# Patient Record
Sex: MALE | Race: WHITE | Employment: FULL TIME | URBAN - METROPOLITAN AREA
[De-identification: names, ages, dates, MRNs, and addresses within clinical notes are randomized per-mention and may not be internally consistent; named-entity substitution may affect disease eponyms.]

---

## 2018-10-30 ENCOUNTER — HOSPITAL ENCOUNTER (OUTPATIENT)
Dept: CT IMAGING | Age: 53
Discharge: HOME | End: 2018-10-30
Payer: SELF-PAY

## 2018-10-30 DIAGNOSIS — Z00.00 PREVENTATIVE HEALTH CARE: ICD-10-CM

## 2018-10-30 PROCEDURE — 75571 CT HRT W/O DYE W/CA TEST: CPT

## 2018-10-31 NOTE — CARDIO/PULMONARY
Patient returned my call. I shared his coronary artery CT score of 712 with him. We discussed the meaning of this score and our recommendation that he follow up with a cardiologist within a week. Patient plans to follow up with Dr. Camille Sexton who is his wife's doctor. Patient questions discussed. Patient has no further questions at this time.

## 2018-11-01 ENCOUNTER — TELEPHONE (OUTPATIENT)
Dept: CARDIOLOGY CLINIC | Age: 53
End: 2018-11-01

## 2018-11-01 NOTE — TELEPHONE ENCOUNTER
Pt wife following on heart scan results to have  seen sooner than first available with Dr. Frandy Robles only.   Phone # [phone] or #[phone]  Thanks

## 2018-11-12 ENCOUNTER — OFFICE VISIT (OUTPATIENT)
Dept: CARDIOLOGY CLINIC | Age: 53
End: 2018-11-12

## 2018-11-12 VITALS
SYSTOLIC BLOOD PRESSURE: 128 MMHG | BODY MASS INDEX: 27.68 KG/M2 | WEIGHT: 222.6 LBS | DIASTOLIC BLOOD PRESSURE: 86 MMHG | RESPIRATION RATE: 12 BRPM | OXYGEN SATURATION: 97 % | HEIGHT: 75 IN | HEART RATE: 64 BPM

## 2018-11-12 DIAGNOSIS — N18.9 CHRONIC KIDNEY DISEASE, UNSPECIFIED CKD STAGE: ICD-10-CM

## 2018-11-12 DIAGNOSIS — N02.8 IGA NEPHROPATHY: ICD-10-CM

## 2018-11-12 DIAGNOSIS — E78.5 HYPERLIPIDEMIA, UNSPECIFIED HYPERLIPIDEMIA TYPE: ICD-10-CM

## 2018-11-12 DIAGNOSIS — R93.1 AGATSTON CAC SCORE, >400: Primary | ICD-10-CM

## 2018-11-12 RX ORDER — GLUCOSAM/CHONDRO/HERB 149/HYAL 750-100 MG
1 TABLET ORAL DAILY
COMMUNITY

## 2018-11-12 RX ORDER — RAMIPRIL 5 MG/1
CAPSULE ORAL
Refills: 4 | COMMUNITY
Start: 2018-10-21

## 2018-11-12 NOTE — PROGRESS NOTES
Ray Dasilva, [facility] [address]  [address]  [place], [address] [facility]    Office [phone]  Fax [phone]  Cell [phone]    Ying Brown is a 48 y.o. male self-referred for evaluation of high CAC. Assessment:   Encounter Diagnoses   Name Primary?  Agatston CAC score, >400 Yes    IgA nephropathy     Chronic kidney disease, unspecified CKD stage     Hyperlipidemia, unspecified hyperlipidemia type          Recommendations:  CAD by CT heart scan (712). He has no sxs of angina at a good functional capacity. Will evaluate for ischemia with stress echo in the near future. Start Aspirin 81 mg/d. Will obtain advanced lipid testing with CHL labs, then tailor therapy accordingly. Long discussion about plant based nutrition, regular exercise. I explained the pathobiology of CAD and the potential downstream manifestations. He and his wife are both motivated to make changes. He was on a statin several years ago, but does not recall any side effects. He was told he was prediabetic at one point, but then lost 10-15 lbs. Regroup to review lab data and stress test.     Follow-up Disposition: Not on File     Subjective:  No previous cardiac history other than mild HTN. He recently underwent CT heart scan, demonstrating calcium score of 712. He has stable CKD from IgA nephropathy. Today, Mr. Jorje Palmer states he is doing well. 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 He exercises 2x/week, with no exertional symptoms. He states he and his wife are trying to improve their diet. He enjoys unsweetened tea. He reports a one year history of numbness/paresthesias in his feet, unchanged by 10-15 lb weight loss. He is interested in seeing a nutritionist.     Patient denies any exertional chest pain, dyspnea, palpitations, syncope, orthopnea, edema or paroxysmal nocturnal dyspnea.     Cardiac risk factors:  HTN yes  DM no  Smoking no    Cardiac testin18 - SR, normal EKG  CT heart scan 10/30/18 - CAC = 712 (LAD, LCX)    Past Medical History:   Diagnosis Date    Chronic kidney disease     Essential hypertension     Hyperlipidemia     IgA nephropathy       No Known Allergies     Review of Systems:  Constitutional: Negative for fever, chills, malaise/fatigue and diaphoresis. Respiratory: Negative for cough, hemoptysis, sputum production, shortness of breath and wheezing. Cardiovascular: Negative for chest pain, palpitations, orthopnea, claudication, leg swelling and PND. Gastrointestinal: Negative for heartburn, nausea, vomiting, blood in stool and melena. Genitourinary: Negative for dysuria and flank pain. Musculoskeletal: Negative for joint pain and back pain. Skin: Negative for rash. Neurological: Negative for focal weakness, seizures, loss of consciousness, weakness and headaches. +paresthesias in LE  Endo/Heme/Allergies: Does not bruise/bleed easily. Psychiatric/Behavioral: Negative for memory loss. The patient does not have insomnia. Physical Exam:  Visit Vitals  /86 (BP 1 Location: Left arm, BP Patient Position: Sitting)   Pulse 64   Resp 12   Ht 6' 3\" (1.905 m)   Wt 222 lb 9.6 oz (101 kg)   SpO2 97%   BMI 27.82 kg/m²     Wt Readings from Last 3 Encounters:   11/12/18 222 lb 9.6 oz (101 kg)      General - well developed well nourished  Neck - JVP normal, thyroid nl  Cardiac - normal S1, S2, no murmurs, rubs or gallops. No clicks  Vascular - carotids without bruits, radials, femorals and pedal pulses equal bilateral  Lungs - clear to auscultation bilaterals, no rales, wheezing or rhonchi  Abd - soft nontender, no HSM, no abd bruits  Extremities - no edema  Skin - no rash  Neuro - nonfocal  Psych - normal mood and affect    Cardiographics:  11/12/18 - SR, normal EKG  CT heart scan 10/30/18 - CAC = 712 (LAD, LCX)    Written by Theresa Ramirez, as dictated by Dr. Jose Smith.

## 2018-11-12 NOTE — PATIENT INSTRUCTIONS
Start Aspirin 81 mg daily. Consider seeing Neurologist Dr. Esperanza Rose    The Ananindsohaa Diet    This heart healthy diet is directed to those who do not want to develop heart  disease and those who want to reverse heart disease. Foods to Avoid  The following foods cannot be eaten if a heart healthy diet is to be effective. 1. Anything with a face or a mother. This includes meat, poultry, fish and eggs. 2. Dairy products that include butter, cheese, cream, ice cream, yogurt, milk and  skimmed milk. 3. Oils: All oils including olive oil and canola oil. 4. Refined grains: White rice, \"enriched\" flour products that are found in pastas,  breads, bagels and baked goods. 5. Nuts: Individuals with heart disease should avoid all nuts. Those without the  disease can consume some walnuts which provide considerable omega-3 fatty  acids. Foods That Are Allowed  The following are foods needed to promote heart health by a cholesterol lowering  diet. 1. Vegetables - Just about all vegetables are permitted on this plan with the  exception of avocados. If you are a cardiac patient avocados have a very high  fat content. Those that do not have heart disease may eat avocados as long as  blood lipids are not elevated. 2. Legumes - Beans, peas, and lentils of all kinds. 3. Whole Grains - Just about any grain as long as it is \"whole\" grain. \"Whole\"  means that it has not been polished or processed to eliminate much of the  nutritional value. You should eat breakfast cereals that do not contain added oil  and sugar. Breads should be whole grain without added oil. Whole grain pastas  are allowed. You should be careful of restaurant pastas that almost always  contain eggs, white flour and some oil. 4. Fruit - Fruit of all kinds are permitted. It's best to limit fruit consumption to  three servings a day. Also, avoid consuming pure fruit juices since you get  excessive amounts of sugar that will elevate your triglycerides.  Be careful of all  desserts for the same reason. 5. Beverages - The heart healthy diet allows the following beverages. Water,  seltzer water, oat milk, no-fat soy milk, coffee, and tea. Alcohol is okay in  moderation. Supplements  For those who have heart disease, a heart healthy diet includes supplements and  Dr. Paul Solitario recommends the followin. Multivitamin - One a day that covers all the basic requirements. 2. Vitamin B12 - 1000 mcg (micrograms) daily. 3. Calcium - People over 50 should take 1000 milligrams daily. ..over 60 take  1200 mg daily. 4. Vitamin D - Those over 50 should take 1000 IU daily. 5. Omega Fatty Acids - Consume one tablespoon of flaxseed meal each day. Perhaps sprinkling on your cereal. Keep flaxseed meal refrigerated. 6. Cholesterol - lowering drugs (if necessary). These must be taken under supervision of a physician. Drugs may be needed if you  cannot get your total cholesterol below 150mg/dL. Monitor your progress for the first two months with the help of your doctor. Have  three to four tests. The first and third test should be a full cholesterol profile. The  As your heart healthy diet progresses and your numbers get better, your doctor  may reduce your medication and possibly eliminate it completely. Your total  cholesterol target is below 150mg/dL. Laboratory Test - Maximum Readings for Long Term Health  The following is the goal set by Dr. Paul Solitario and the end result of following his  program for a heart healthy diet. 1. Total blood cholesterol should be 150mg/dL or less. 2. LDL levels 80mg/dL or lower. This should be achieved by using strict plant based nutrition, and when necessary,  doses of cholesterol lowering drugs. \"EAT FOOD, NOT TOO MUCH, MOSTLY PLANTS\"  Bishnu Morrow    We highly recommend this book. .. Prevent and Reverse Heart Disease - The revolutionary, scientifically proven,  nutrition based cure for heart disease.

## 2018-11-12 NOTE — PROGRESS NOTES
Room 4  No cardiac complaints at this time. He does have numbness both feet times 1 year.     Visit Vitals  /86 (BP 1 Location: Left arm, BP Patient Position: Sitting)   Pulse 64   Resp 12   Ht 6' 3\" (1.905 m)   Wt 222 lb 9.6 oz (101 kg)   SpO2 97%   BMI 27.82 kg/m²

## 2018-11-16 PROBLEM — R93.1 AGATSTON CAC SCORE, >400: Status: ACTIVE | Noted: 2018-11-16

## 2018-11-16 RX ORDER — GUAIFENESIN 100 MG/5ML
81 LIQUID (ML) ORAL DAILY
Qty: 90 TAB | Refills: 3
Start: 2018-11-16

## 2018-12-05 ENCOUNTER — DOCUMENTATION ONLY (OUTPATIENT)
Dept: CARDIOLOGY CLINIC | Age: 53
End: 2018-12-05

## 2018-12-05 NOTE — PROGRESS NOTES
Labs Drawn 11/19/18     In Range Out of Range   Lp-PLA2  83   CRP  2.8   OxLDL     Fibrinogen Mass     Apolipoprotein A1 135    Apolipoprotein B  132   ApoB/ApoA1 Ratio  0.98   sdLDL  41.5   Lp(a)  159   HDL2b 31    Total Cholesterol  215   LDL-Calculated  146   Direct HDL Chol. 39   Triglycerides  150   Non-HDL Chol.   176   Insulin 5.6    HbA1C 5.6    Glucose 114    OxLDL     TMAO 3.4    Adiponectin     Homocysteine     NT-proBNP 88    Vitamin D  28.9   Glucose 99    Calcium 9.7    Sodium 142    Potassium 4.4    Chloride 105    CO2 23    BUN 15    Creatinine 1.26    Albumin 4.6    Total Protein 7.0    Globulin 2.4    ALP 73    ALT 16    AST 22    Total bilirubin 0.4    EGFR, NonAA 65    EGFR, AA 73    Estradiol     Estrone, Serum     Total testosterone     TSH     T3, Free     APO E Genotype 3/3

## 2018-12-05 NOTE — PROGRESS NOTES
Labs drawn High Risk Intermediate Risk Optimal  
Total Cholesterol LDL     
HDL     
TG     
Non-HDL Apo B     
LDL-P Small LDL-P     
sdLDL-C Apo A-I     
HDL-P     
HDL2-C Apo B: Apo A-I ratio     
Lp(a)-P Hs-CRP Lp-PLA2 Myeloperoxidase NT-proBNP Apolipoprotein E     
[id number]* [id number]*14* Factor V Leiden Prothrombin Mutation MTHFR     
25-hydroxy-Vitamin D Homocysteine Creatinine, serum Campesterol Sitosterol Cholestanol Desmosterol Glucose Free Fatty Acid Insulin Omega 3

## 2018-12-06 ENCOUNTER — CLINICAL SUPPORT (OUTPATIENT)
Dept: CARDIOLOGY CLINIC | Age: 53
End: 2018-12-06

## 2018-12-06 ENCOUNTER — DOCUMENTATION ONLY (OUTPATIENT)
Dept: CARDIOLOGY CLINIC | Age: 53
End: 2018-12-06

## 2018-12-06 DIAGNOSIS — E78.5 HYPERLIPIDEMIA, UNSPECIFIED HYPERLIPIDEMIA TYPE: ICD-10-CM

## 2018-12-06 DIAGNOSIS — R93.1 AGATSTON CAC SCORE, >400: ICD-10-CM

## 2018-12-06 NOTE — PROGRESS NOTES
Labs Drawn 11/19/18     In Range Out of Range   Lp-PLA2  83   CRP  2.8   OxLDL     Fibrinogen Mass     Apolipoprotein A1 135    Apolipoprotein B  132   ApoB/ApoA1 Ratio  0.98   sdLDL  41.5   Lp(a)  159   HDL2b 31    Total Cholesterol  215   LDL-Calculated  146   Direct HDL Chol. 39   Triglycerides  150   Non-HDL Chol.   176   Insulin  5.6   HbA1C  5.6   Glucose     OxLDL     TMAO 3.4    Adiponectin     Homocysteine     NT-proBNP 88    Vitamin D  28.9   Glucose 99    Calcium 9.7    Sodium 142    Potassium 4.4    Chloride 105    CO2 23    BUN 15    Creatinine 1.26    Albumin 4.6    Total Protein 7.0    Globulin 2.4    ALP 73    ALT 16    AST 22    Total bilirubin 0.4    EGFR, NonAA 65    EGFR, AA 76    Estradiol     Estrone, Serum     Total testosterone     TSH     T3, Free     APO E Genotype 3/3

## 2018-12-10 ENCOUNTER — TELEPHONE (OUTPATIENT)
Dept: CARDIOLOGY CLINIC | Age: 53
End: 2018-12-10

## 2018-12-10 NOTE — TELEPHONE ENCOUNTER
----- Message from Anna Doshi MD sent at 12/10/2018 11:09 AM EST -----  Normal stress echo. Reviewed recent labs - needs office visit to discuss in more detail - will need treatment. Can you let him know?

## 2018-12-18 ENCOUNTER — TELEPHONE (OUTPATIENT)
Dept: CARDIOLOGY CLINIC | Age: 53
End: 2018-12-18

## 2018-12-18 NOTE — TELEPHONE ENCOUNTER
[facility] is calling in regards to wanting to get the diagnosis code for the patient. Phone# [phone]  Thanks.

## 2018-12-27 ENCOUNTER — OFFICE VISIT (OUTPATIENT)
Dept: CARDIOLOGY CLINIC | Age: 53
End: 2018-12-27

## 2018-12-27 VITALS
WEIGHT: 224.4 LBS | DIASTOLIC BLOOD PRESSURE: 70 MMHG | BODY MASS INDEX: 27.9 KG/M2 | OXYGEN SATURATION: 97 % | HEART RATE: 67 BPM | SYSTOLIC BLOOD PRESSURE: 110 MMHG | HEIGHT: 75 IN

## 2018-12-27 DIAGNOSIS — N18.9 CHRONIC KIDNEY DISEASE, UNSPECIFIED CKD STAGE: ICD-10-CM

## 2018-12-27 DIAGNOSIS — N02.8 IGA NEPHROPATHY: ICD-10-CM

## 2018-12-27 DIAGNOSIS — R93.1 AGATSTON CAC SCORE, >400: Primary | ICD-10-CM

## 2018-12-27 DIAGNOSIS — E78.41 ELEVATED LIPOPROTEIN(A): ICD-10-CM

## 2018-12-27 RX ORDER — ROSUVASTATIN CALCIUM 10 MG/1
10 TABLET, COATED ORAL DAILY
Qty: 30 TAB | Refills: 3 | Status: SHIPPED | OUTPATIENT
Start: 2018-12-27 | End: 2018-12-27 | Stop reason: SDUPTHER

## 2018-12-27 RX ORDER — ROSUVASTATIN CALCIUM 10 MG/1
10 TABLET, COATED ORAL DAILY
Qty: 30 TAB | Refills: 3 | Status: SHIPPED | OUTPATIENT
Start: 2018-12-27 | End: 2019-04-16 | Stop reason: SDUPTHER

## 2018-12-27 NOTE — PROGRESS NOTES
Ray Harrison Comment, [address]  [address]  [place], [address] [facility]    Office [phone]  Fax [phone]  Cell [phone]    Marcio Cooley is a 48 y.o. male self-referred for evaluation of high CAC. Assessment:   Encounter Diagnoses   Name Primary?  IgA nephropathy     Chronic kidney disease, unspecified CKD stage     Agatston CAC score, >400 Yes    Elevated lipoprotein(a)          Recommendations:  CAD by CT heart scan (712) without evidence of myocardial ischemia by recent stress echo. He has no sxs of angina at a good functional capacity. Continue aspirin 81 mg/d. Start statin Rx - see below Long discussion about plant based nutrition, regular exercise. Dyslipidemia with elevated Lp(a), elevated inflammatory markers (Lp-PLA2, CRP), excess small markers but no evidence of IR. Reviewed impact of markers in detail. Long discussion about clean eating, plant based nutrition - they are onboard. Will start rosuvastatin 10 mg/d+ CoQ10 100 mg bid. Repeat CHL labs in 3 months    Low Vit D3 - supplement with 3000 international units daily. Follow-up Disposition:  Return in about 3 months (around 3/27/2019). Subjective:  Feels great. Recent stress echo nl. Patient denies any exertional chest pain, dyspnea, palpitations, syncope, orthopnea, edema or paroxysmal nocturnal dyspnea. Here with his wife. Cardiac risk factors:  HTN yes  DM no  Smoking no    Cardiac testin18 - SR, normal EKG  CT heart scan 10/30/18 - CAC = 712 (LAD, LCX)   Stress echo 2018 - 11 min, normal study    Past Medical History:   Diagnosis Date    Chronic kidney disease     Essential hypertension     Hyperlipidemia     IgA nephropathy       No Known Allergies     . 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    Respiratory: Negative for cough, hemoptysis, sputum production, shortness of breath and wheezing. Cardiovascular: Negative for chest pain, palpitations, orthopnea, claudication, leg swelling and PND. Gastrointestinal: Negative for heartburn, nausea, vomiting, blood in stool and melena. Genitourinary: Negative for dysuria and flank pain. Musculoskeletal: Negative for joint pain and back pain. Skin: Negative for rash. Neurological: Negative for focal weakness, seizures, loss of consciousness, weakness and headaches. +paresthesias in LE  Endo/Heme/Allergies: Does not bruise/bleed easily. Psychiatric/Behavioral: Negative for memory loss. The patient does not have insomnia. Physical Exam:  Visit Vitals  /70 (BP 1 Location: Left arm, BP Patient Position: Sitting)   Pulse 67   Ht 6' 3\" (1.905 m)   Wt 224 lb 6.4 oz (101.8 kg)   SpO2 97%   BMI 28.05 kg/m²     Wt Readings from Last 3 Encounters:   12/27/18 224 lb 6.4 oz (101.8 kg)   11/12/18 222 lb 9.6 oz (101 kg)      General - well developed well nourished  Neck - JVP normal, thyroid nl  Cardiac - normal S1, S2, no murmurs, rubs or gallops.  No clicks  Vascular - carotids without bruits, radials, femorals and pedal pulses equal bilateral  Lungs - clear to auscultation bilaterals, no rales, wheezing or rhonchi  Abd - soft nontender, no HSM, no abd bruits  Extremities - no edema  Skin - no rash  Neuro - nonfocal  Psych - normal mood and affect    Cardiographics:  11/12/18 - SR, normal EKG  CT heart scan 10/30/18 - CAC = 712 (LAD, LCX)      Rafia Duff MD

## 2018-12-27 NOTE — PATIENT INSTRUCTIONS
Start CoQ10 100 mg twice daily  Start Vit D3 3000 IUs daily  Start Crestor 10 mg daily      Repeat labs in 3 months

## 2018-12-27 NOTE — PROGRESS NOTES
Patient says he has numbness in feet    Visit Vitals  /70 (BP 1 Location: Left arm, BP Patient Position: Sitting)   Pulse 67   Ht 6' 3\" (1.905 m)   Wt 224 lb 6.4 oz (101.8 kg)   SpO2 97%   BMI 28.05 kg/m²

## 2019-03-25 NOTE — PROGRESS NOTES
Ray Kincaid, [facility] [address]  [address] [facility], [address] [facility] [place]    Office [phone]  Fax [phone]  Cell [phone]    Pete Carrillo is a 48 y.o. male. Last seen 6 months ago. Assessment:   Encounter Diagnoses   Name Primary?  Agatston CAC score, >400     Elevated lipoprotein(a)     IgA nephropathy     Dyslipidemia Yes     Recommendations:  CAD by CT heart scan (712) without evidence of myocardial ischemia by recent stress echo. Stress echo 2018 was normal. He has no sxs of angina at a very good functional capacity. Continue aspirin 81 mg/d plus statin therapy. His diet seems to be fairly clean, low carb. Dyslipidemia with elevated Lp(a), elevated inflammatory markers (Lp-PLA2, CRP). Good response with the addition of Crestor 10 mg daily. LP-PLA2 has normalized, but CRP remains elevated (Possible joint inflammation from weight lifting?) LDL-c has decreased from 146 to 94 and Apo-B from 132 to 92. FBS is in the  range despite his slim figure and low carb diet. Continue to monitor. Repeat CHL labs in 6 months. Continue Vitamin D3. Check vitamin D levels in 6 months. Follow-up and Dispositions    · Return in about 6 months (around 2019). Subjective:  Mr. Keyonna Villalpando states he is doing well. He is tolerating Crestor with no side effects. He has been following a clean diet, and has decreased his taking of carbohydrates. He works out at [employer] 4-5x/week, with no exertional symptoms. Patient denies any exertional chest pain, dyspnea, palpitations, syncope, orthopnea, edema or paroxysmal nocturnal dyspnea. He further denies any fevers or inflammation.      Cardiac risk factors:  HTN yes  DM no  Smoking no    Cardiac testin18 - SR, normal EKG  CT heart scan 10/30/18 - CAC = 712 (LAD, LCX)   Stress echo 2018 - 11 min, normal study    Past Medical History:   Diagnosis Date    Chronic kidney disease  Essential hypertension     Hyperlipidemia     IgA nephropathy       No Known Allergies     . works in [employer] for [employer]. Review of Systems:  Constitutional: Negative for fever, chills, malaise/fatigue and diaphoresis. Respiratory: Negative for cough, hemoptysis, sputum production, shortness of breath and wheezing. Cardiovascular: Negative for chest pain, palpitations, orthopnea, claudication, leg swelling and PND. Gastrointestinal: Negative for heartburn, nausea, vomiting, blood in stool and melena. Genitourinary: Negative for dysuria and flank pain. Musculoskeletal: Negative for joint pain and back pain. Skin: Negative for rash. Neurological: Negative for focal weakness, seizures, loss of consciousness, weakness and headaches. Endo/Heme/Allergies: Does not bruise/bleed easily. Psychiatric/Behavioral: Negative for memory loss. The patient does not have insomnia. Physical Exam:  Visit Vitals  /68   Pulse 74   Resp 20   Ht 6' 3\" (1.905 m)   Wt 220 lb (99.8 kg)   SpO2 96%   BMI 27.50 kg/m²     Wt Readings from Last 3 Encounters:   03/27/19 220 lb (99.8 kg)   12/27/18 224 lb 6.4 oz (101.8 kg)   11/12/18 222 lb 9.6 oz (101 kg)      General - well developed well nourished  Neck - JVP normal, thyroid nl  Cardiac - normal S1, S2, no murmurs, rubs or gallops. No clicks  Vascular - carotids without bruits, radials, femorals and pedal pulses equal bilateral  Lungs - clear to auscultation bilaterals, no rales, wheezing or rhonchi  Abd - soft nontender, no HSM, no abd bruits  Extremities - no edema  Skin - no rash  Neuro - nonfocal  Psych - normal mood and affect    Cardiographics:  11/12/18 - SR, normal EKG  CT heart scan 10/30/18 - CAC = 712 (LAD, LCX)    Written by Pantera Miller, as dictated by Dr. Raoul Alfredo.      Raoul Alfredo MD

## 2019-03-27 ENCOUNTER — DOCUMENTATION ONLY (OUTPATIENT)
Dept: CARDIOLOGY CLINIC | Age: 54
End: 2019-03-27

## 2019-03-27 ENCOUNTER — OFFICE VISIT (OUTPATIENT)
Dept: CARDIOLOGY CLINIC | Age: 54
End: 2019-03-27

## 2019-03-27 VITALS
DIASTOLIC BLOOD PRESSURE: 68 MMHG | HEART RATE: 74 BPM | RESPIRATION RATE: 20 BRPM | WEIGHT: 220 LBS | HEIGHT: 75 IN | BODY MASS INDEX: 27.35 KG/M2 | OXYGEN SATURATION: 96 % | SYSTOLIC BLOOD PRESSURE: 126 MMHG

## 2019-03-27 DIAGNOSIS — E78.5 DYSLIPIDEMIA: Primary | ICD-10-CM

## 2019-03-27 DIAGNOSIS — R93.1 AGATSTON CAC SCORE, >400: ICD-10-CM

## 2019-03-27 DIAGNOSIS — E78.41 ELEVATED LIPOPROTEIN(A): ICD-10-CM

## 2019-03-27 DIAGNOSIS — N02.8 IGA NEPHROPATHY: ICD-10-CM

## 2019-03-27 RX ORDER — ERGOCALCIFEROL 1.25 MG/1
50000 CAPSULE ORAL
COMMUNITY
End: 2019-03-27

## 2019-03-27 NOTE — PROGRESS NOTES
Visit Vitals  /68   Pulse 74   Resp 20   Ht 6' 3\" (1.905 m)   Wt 220 lb (99.8 kg)   SpO2 96%   BMI 27.50 kg/m²     CHL today

## 2019-03-27 NOTE — PROGRESS NOTES
Labs Drawn 3/19/19     In Range Out of Range   Lp-PLA2 66    CRP  3.7   OxLDL     LDL Particle Num  [id number]   Fibrinogen Mass     Apolipoprotein A1     Apolipoprotein B  92   ApoB/ApoA1 Ratio     sdLDL     Lp(a)     HDL2b     Total Cholesterol 152    LDL-Calculated 94    Direct HDL Chol.      HDL-C  39   Triglycerides 94    HDL-Particle Num  29.2   Small LDL Particle  Num  [id number]   Large VLDL-P 1.1    LDL Size 20.6    Large HDL-P  2.5   VLDL Size 42.1    HDL Size  8.5   LP-IR Score  52   Insulin 6.3    HbA1C     Glucose  100   OxLDL     TMAO     Adiponectin     Homocysteine     NT-proBNP     Vitamin D     Glucose  100   Calcium 9.5    Sodium 141    Potassium 4.3    Chloride 103    CO2 26    BUN 12    Creatinine  1.35   Albumin 4.6    Total Protein 6.8    Globulin 2.2    ALP 74    ALT 26    AST 25    Total bilirubin 0.4    EGFR, NonAA  60   EGFR, AA 70    Estradiol     Estrone, Serum     Total testosterone     TSH     T3, Free     APO E Genotype

## 2019-04-01 PROBLEM — E78.5 DYSLIPIDEMIA: Status: ACTIVE | Noted: 2019-04-01

## 2019-09-25 ENCOUNTER — OFFICE VISIT (OUTPATIENT)
Dept: CARDIOLOGY CLINIC | Age: 54
End: 2019-09-25

## 2019-09-25 VITALS
DIASTOLIC BLOOD PRESSURE: 80 MMHG | BODY MASS INDEX: 26.88 KG/M2 | SYSTOLIC BLOOD PRESSURE: 110 MMHG | HEIGHT: 75 IN | OXYGEN SATURATION: 98 % | WEIGHT: 216.2 LBS | HEART RATE: 73 BPM

## 2019-09-25 DIAGNOSIS — N18.9 CHRONIC KIDNEY DISEASE, UNSPECIFIED CKD STAGE: ICD-10-CM

## 2019-09-25 DIAGNOSIS — E78.5 DYSLIPIDEMIA: ICD-10-CM

## 2019-09-25 DIAGNOSIS — E78.41 ELEVATED LIPOPROTEIN(A): ICD-10-CM

## 2019-09-25 DIAGNOSIS — R93.1 AGATSTON CAC SCORE, >400: Primary | ICD-10-CM

## 2019-09-25 DIAGNOSIS — N02.8 IGA NEPHROPATHY: ICD-10-CM

## 2019-09-25 RX ORDER — CHOLECALCIFEROL TAB 125 MCG (5000 UNIT) 125 MCG
TAB ORAL DAILY
COMMUNITY

## 2019-09-25 NOTE — PROGRESS NOTES
Patient says he has no cardiac complaints today       Visit Vitals  /80 (BP 1 Location: Right arm, BP Patient Position: Sitting)   Pulse 73   Ht 6' 3\" (1.905 m)   Wt 216 lb 3.2 oz (98.1 kg)   SpO2 98%   BMI 27.02 kg/m²

## 2019-09-25 NOTE — PROGRESS NOTES
[facility], [facility] [address]  [address] [place], [facility], [address] [facility]    Office [phone]  Fax [phone]  Cell [phone]      Pamela Marin is a 48 y.o. male. Last seen 6 months ago. Assessment:   Encounter Diagnoses   Name Primary?  Agatston CAC score, >400 Yes    Dyslipidemia     Elevated lipoprotein(a)     IgA nephropathy     Chronic kidney disease, unspecified CKD stage      Recommendations:    CAD by CT heart scan (713)  Oct 2018. Stress echo at that time was normal. He has no sxs of angina at a very good functional capacity. Continue ASA 81mg/d plus statin therapy. His diet seems to be fairly clean, low carb. Dyslipidemia with elevated Lp(a), chronic low grade elevation of CRP. LDL 90 on Crestor 10mg/d, ApoB 86. Interval increase in FBS despite his slim figure and low carb diet. He will try to work harder in terms of his diet. We discussed the role of Metformin or Actos. Continue to monitor. Repeat CHL labs in 6 months. IgA nephropathy, followed by Dr. Shruthi Kong. Recent CRE 1.48 with EGFR 54. Will forward these results to Dr. Shruthi Kong. Low vitamin D3. Updated level 49. Continue current supplements. Follow-up and Dispositions    · Return in about 6 months (around 3/25/2020). Subjective:    Pamela Marin reports he is feeling well overall. He still works out regularly with weight lifting, without exertional sxs. Patient denies any exertional chest pain, dyspnea, palpitations, syncope, orthopnea, edema or paroxysmal nocturnal dyspnea. He reports neuropathy in his feet d/t prediabetes. He has adjusted his diet to try to reduce simple carbs. He still eats whole wheat pasta at times, but he is considering eliminating it altogether. He drinks alcohol rarely. Of note, he spent his summer taking care of his grandchildren.      Cardiac risk factors:  HTN yes  DM no  Smoking no    Cardiac testin18 - SR, normal EKG  CT heart scan 10/30/18 - CAC = 712 (LAD, LCX)   Stress echo 12/2018 - 11 min, normal study    Past Medical History:   Diagnosis Date    Chronic kidney disease     Essential hypertension     Hyperlipidemia     IgA nephropathy       No Known Allergies     . 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 Respiratory: Negative for cough, hemoptysis, sputum production, shortness of breath and wheezing. Cardiovascular: Negative for chest pain, palpitations, orthopnea, claudication, leg swelling and PND. Gastrointestinal: Negative for heartburn, nausea, vomiting, blood in stool and melena. Genitourinary: Negative for dysuria and flank pain. Musculoskeletal: Negative for joint pain and back pain. Skin: Negative for rash. Neurological: Negative for focal weakness, seizures, loss of consciousness, weakness and headaches. Endo/Heme/Allergies: Does not bruise/bleed easily. Psychiatric/Behavioral: Negative for memory loss. The patient does not have insomnia. Physical Exam:  Visit Vitals  /80 (BP 1 Location: Right arm, BP Patient Position: Sitting)   Pulse 73   Ht 6' 3\" (1.905 m)   Wt 216 lb 3.2 oz (98.1 kg)   SpO2 98%   BMI 27.02 kg/m²     Wt Readings from Last 3 Encounters:   09/25/19 216 lb 3.2 oz (98.1 kg)   03/27/19 220 lb (99.8 kg)   12/27/18 224 lb 6.4 oz (101.8 kg)      General - well developed well nourished  Neck - JVP normal, thyroid nl  Cardiac - normal S1, S2, no murmurs, rubs or gallops.  No clicks  Vascular - carotids without bruits, radials, femorals and pedal pulses equal bilateral  Lungs - clear to auscultation bilaterals, no rales, wheezing or rhonchi  Abd - soft nontender, no HSM, no abd bruits  Extremities - no edema  Skin - no rash  Neuro - nonfocal  Psych - normal mood and affect    Cardiographics:  11/12/18 - SR, normal EKG  CT heart scan 10/30/18 - CAC = 712 (LAD, LCX)    Written by Roseann Dakin Maynor Jean, as dictated by LUDWIG Zurita MD

## 2020-03-19 RX ORDER — ROSUVASTATIN CALCIUM 10 MG/1
10 TABLET, COATED ORAL DAILY
Qty: 90 TAB | Refills: 0 | Status: SHIPPED | OUTPATIENT
Start: 2020-03-19 | End: 2020-03-26

## 2020-03-19 NOTE — TELEPHONE ENCOUNTER
Per VO of Dr. Gauthier Masker: 9/25/2019    No future appointment. Due March 2020  Labs are due March 2020. Requested Prescriptions     Pending Prescriptions Disp Refills    rosuvastatin (CRESTOR) 10 mg tablet 90 Tab 0     Sig: Take 1 Tab by mouth daily.

## 2020-03-19 NOTE — TELEPHONE ENCOUNTER
Requested Prescriptions     Pending Prescriptions Disp Refills    rosuvastatin (CRESTOR) 10 mg tablet 90 Tab 0     Sig: Take 1 Tab by mouth daily.     refill approved VO

## 2020-03-26 RX ORDER — ROSUVASTATIN CALCIUM 10 MG/1
TABLET, COATED ORAL
Qty: 90 TAB | Refills: 1 | Status: SHIPPED | OUTPATIENT
Start: 2020-03-26 | End: 2020-04-28 | Stop reason: SDUPTHER

## 2020-03-26 NOTE — TELEPHONE ENCOUNTER
Requested Different pharmacy  Requested Prescriptions     Pending Prescriptions Disp Refills    rosuvastatin (CRESTOR) 10 mg tablet [Pharmacy Med Name: ROSUVASTATIN CALCIUM 10 MG TAB] 90 Tab 1     Sig: TAKE 1 TABLET BY MOUTH EVERY DAY     EMILY Leung

## 2020-07-23 DIAGNOSIS — R93.1 AGATSTON CAC SCORE, >400: Primary | ICD-10-CM

## 2020-07-23 DIAGNOSIS — E78.5 DYSLIPIDEMIA: ICD-10-CM

## 2020-07-23 DIAGNOSIS — E78.41 ELEVATED LIPOPROTEIN(A): ICD-10-CM

## 2020-07-23 DIAGNOSIS — N18.9 CHRONIC KIDNEY DISEASE, UNSPECIFIED CKD STAGE: ICD-10-CM

## 2020-07-23 DIAGNOSIS — E78.5 HYPERLIPIDEMIA, UNSPECIFIED HYPERLIPIDEMIA TYPE: ICD-10-CM

## 2020-07-23 RX ORDER — ROSUVASTATIN CALCIUM 10 MG/1
TABLET, COATED ORAL
Qty: 30 TAB | Refills: 2 | Status: SHIPPED | OUTPATIENT
Start: 2020-07-23 | End: 2020-10-21

## 2020-08-05 ENCOUNTER — HOSPITAL ENCOUNTER (OUTPATIENT)
Dept: LAB | Age: 55
Discharge: HOME | End: 2020-08-05

## 2020-08-05 DIAGNOSIS — E78.5 HYPERLIPIDEMIA, UNSPECIFIED HYPERLIPIDEMIA TYPE: ICD-10-CM

## 2020-08-05 DIAGNOSIS — N18.9 CHRONIC KIDNEY DISEASE, UNSPECIFIED CKD STAGE: ICD-10-CM

## 2020-08-05 DIAGNOSIS — E78.41 ELEVATED LIPOPROTEIN(A): ICD-10-CM

## 2020-08-05 DIAGNOSIS — E78.5 DYSLIPIDEMIA: ICD-10-CM

## 2020-08-05 DIAGNOSIS — R93.1 AGATSTON CAC SCORE, >400: ICD-10-CM

## 2020-08-05 LAB
ALBUMIN SERPL-MCNC: 4 G/DL (ref 3.5–5)
ALBUMIN/GLOB SERPL: 1.2 {RATIO} (ref 1.1–2.2)
ALP SERPL-CCNC: 67 U/L (ref 45–117)
ALT SERPL-CCNC: 31 U/L (ref 12–78)
ANION GAP SERPL CALC-SCNC: 6 MMOL/L (ref 5–15)
AST SERPL-CCNC: 20 U/L (ref 15–37)
BILIRUB SERPL-MCNC: 0.5 MG/DL (ref 0.2–1)
BUN SERPL-MCNC: 13 MG/DL (ref 6–20)
BUN/CREAT SERPL: 10 (ref 12–20)
CALCIUM SERPL-MCNC: 8.6 MG/DL (ref 8.5–10.1)
CHLORIDE SERPL-SCNC: 110 MMOL/L (ref 97–108)
CO2 SERPL-SCNC: 25 MMOL/L (ref 21–32)
CREAT SERPL-MCNC: 1.28 MG/DL (ref 0.7–1.3)
GLOBULIN SER CALC-MCNC: 3.3 G/DL (ref 2–4)
GLUCOSE SERPL-MCNC: 98 MG/DL (ref 65–100)
POTASSIUM SERPL-SCNC: 4.2 MMOL/L (ref 3.5–5.1)
PROT SERPL-MCNC: 7.3 G/DL (ref 6.4–8.2)
SODIUM SERPL-SCNC: 141 MMOL/L (ref 136–145)

## 2020-08-06 LAB
CHOLEST SERPL-MCNC: 166 MG/DL (ref 100–199)
HDL SERPL-SCNC: 34.4 UMOL/L
HDLC SERPL-MCNC: 38 MG/DL
LDL SERPL QN: 20.4 NM
LDL SERPL-SCNC: 1227 NMOL/L
LDL SMALL SERPL-SCNC: 585 NMOL/L
LDLC SERPL CALC-MCNC: 95 MG/DL (ref 0–99)
LP-IR SCORE SERPL: 46
TRIGL SERPL-MCNC: 164 MG/DL (ref 0–149)

## 2020-08-07 NOTE — PROGRESS NOTES
[address]  [address] [place], [facility], [address] [facility]    Office [phone]  Fax [phone]  Cell [phone]      Yandy Bella is a 50 y.o. male. Last seen 11 months ago. Assessment:   Encounter Diagnoses   Name Primary?  Dyslipidemia     Agatston CAC score, >400 Yes    Elevated Lp(a)     Chronic kidney disease, unspecified CKD stage     Vitamin D deficiency      Recommendations:    CAD by CT heart scan (512)  Oct 2018. Stress echo at that time was normal. He has no sxs of angina at a very good functional capacity. - Continue ASA 81mg/d. Dyslipidemia with elevated Lp(a). Updated lipids w/ NMR demonstrated LDL 95, , LDL(p) 1227 w/ excess small particles. FBS normal. We discussed trying to reduce simple carbs further in his diet. He admits to slipping during the pandemic.   - Continue Crestor 10 mg/d  -  Repeat lipids w/ NMR in 1 year     IgA nephropathy, followed by Dr. Michelle Schmidt. Recent CR 1.28 w/ EGFR 59. Will forward these results to Dr. Michelle Schmidt. Hx of Low vitamin D3. Level 1 year ago 52.   - Continue current supplements.   - Recheck in 1 year    Follow-up and Dispositions    · Return in about 1 year (around 8/10/2021). Subjective:    Yandy Bella reports no interval cardiac sxs. No exertional sxs. Patient denies any exertional chest pain, dyspnea, palpitations, syncope, orthopnea, edema or paroxysmal nocturnal dyspnea. He reports walking frequently. He states that his diet needs improvement since he does eats a lot of carbs. CKD followed by Dr. Michelle Schmidt (nephrology).      Of note, he has been working home since March    Cardiac risk factors:  HTN yes  DM no  Smoking no    Cardiac testin18 - SR, normal EKG  CT heart scan 10/30/18 - CAC = 712 (LAD, LCX)   Stress echo 2018 - 11 min, normal study    Past Medical History:   Diagnosis Date    Chronic kidney disease     Essential hypertension     Hyperlipidemia     IgA nephropathy       No Known Allergies     . 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 Respiratory: Negative for cough, hemoptysis, sputum production, shortness of breath and wheezing. Cardiovascular: Negative for chest pain, palpitations, orthopnea, claudication, leg swelling and PND. Gastrointestinal: Negative for heartburn, nausea, vomiting, blood in stool and melena. Genitourinary: Negative for dysuria and flank pain. Musculoskeletal: Negative for joint pain and back pain. Skin: Negative for rash. Neurological: Negative for focal weakness, seizures, loss of consciousness, weakness and headaches. Endo/Heme/Allergies: Does not bruise/bleed easily. Psychiatric/Behavioral: Negative for memory loss. The patient does not have insomnia. Physical Exam:  Visit Vitals  /84 (BP 1 Location: Left arm, BP Patient Position: Sitting)   Pulse 73   Ht 6' 3\" (1.905 m)   Wt 227 lb (103 kg)   SpO2 97%   BMI 28.37 kg/m²     Wt Readings from Last 3 Encounters:   08/10/20 227 lb (103 kg)   09/25/19 216 lb 3.2 oz (98.1 kg)   03/27/19 220 lb (99.8 kg)      General - well developed well nourished  Neck - JVP normal, thyroid nl  Cardiac - normal S1, S2, no murmurs, rubs or gallops. No clicks  Vascular - carotids without bruits, radials, femorals and pedal pulses equal bilateral  Lungs - clear to auscultation bilaterals, no rales, wheezing or rhonchi  Abd - soft nontender, no HSM, no abd bruits  Extremities - no edema  Skin - no rash  Neuro - nonfocal  Psych - normal mood and affect    Cardiographics:  11/12/18 - SR, normal EKG  CT heart scan 10/30/18 - CAC = 712 (LAD, LCX)  EKG 8/10/20- SB 57    Written by Edilma Maxwell, as dictated by Marium Rodriguez M.D.      Marium Rodriguez MD

## 2020-08-10 ENCOUNTER — OFFICE VISIT (OUTPATIENT)
Dept: CARDIOLOGY CLINIC | Age: 55
End: 2020-08-10
Payer: COMMERCIAL

## 2020-08-10 VITALS
DIASTOLIC BLOOD PRESSURE: 84 MMHG | HEIGHT: 75 IN | SYSTOLIC BLOOD PRESSURE: 122 MMHG | OXYGEN SATURATION: 97 % | BODY MASS INDEX: 28.23 KG/M2 | WEIGHT: 227 LBS | HEART RATE: 73 BPM

## 2020-08-10 DIAGNOSIS — R93.1 AGATSTON CAC SCORE, >400: Primary | ICD-10-CM

## 2020-08-10 DIAGNOSIS — E55.9 VITAMIN D DEFICIENCY: ICD-10-CM

## 2020-08-10 DIAGNOSIS — E78.5 DYSLIPIDEMIA: ICD-10-CM

## 2020-08-10 DIAGNOSIS — N18.9 CHRONIC KIDNEY DISEASE, UNSPECIFIED CKD STAGE: ICD-10-CM

## 2020-08-10 DIAGNOSIS — E78.41 ELEVATED LP(A): ICD-10-CM

## 2020-08-10 PROCEDURE — 93000 ELECTROCARDIOGRAM COMPLETE: CPT | Performed by: SPECIALIST

## 2020-08-10 PROCEDURE — 99214 OFFICE O/P EST MOD 30 MIN: CPT | Performed by: SPECIALIST

## 2020-08-10 NOTE — LETTER
8/10/20 Patient: Nancie Sánchez YOB: 1965 Date of Visit: 8/10/2020 Maximiliano Scott MD 
[address] [facility] [address] V[address] Facsimile: [phone] Dear Maximiliano Scott MD, Thank you for referring Mr. Nancie Sánchez to [address] [place] [address] for evaluation. My notes for this consultation are attached. If you have questions, please do not hesitate to call me. I look forward to following your patient along with you. Sincerely, Inga Caceres MD

## 2020-08-10 NOTE — PROGRESS NOTES
Chief Complaint   Patient presents with    Cholesterol Problem     1. Have you been to the ER, urgent care clinic since your last visit? Hospitalized since your last visit? No    2. Have you seen or consulted any other health care providers outside of the [address] [place] [address] since your last visit? Include any pap smears or colon screening.  No    Visit Vitals  /84 (BP 1 Location: Left arm, BP Patient Position: Sitting)   Pulse 73   Ht 6' 3\" (1.905 m)   Wt 227 lb (103 kg)   SpO2 97%   BMI 28.37 kg/m²     Shortness of breath: No  Dizziness: No  Palpitations: No  Edema: No  Chest pain: No

## 2021-08-03 PROBLEM — E78.5 HYPERLIPIDEMIA: Status: RESOLVED | Noted: 2021-08-03 | Resolved: 2021-08-03
